# Patient Record
Sex: MALE | Race: WHITE | Employment: OTHER | URBAN - NONMETROPOLITAN AREA
[De-identification: names, ages, dates, MRNs, and addresses within clinical notes are randomized per-mention and may not be internally consistent; named-entity substitution may affect disease eponyms.]

---

## 2020-07-29 ENCOUNTER — OFFICE VISIT (OUTPATIENT)
Dept: PAIN MANAGEMENT | Age: 75
End: 2020-07-29
Payer: MEDICARE

## 2020-07-29 VITALS — DIASTOLIC BLOOD PRESSURE: 65 MMHG | SYSTOLIC BLOOD PRESSURE: 94 MMHG

## 2020-07-29 PROBLEM — S32.020A COMPRESSION FRACTURE OF L2 LUMBAR VERTEBRA (HCC): Status: ACTIVE | Noted: 2020-07-29

## 2020-07-29 PROBLEM — S32.030A COMPRESSION FRACTURE OF L3 LUMBAR VERTEBRA, CLOSED, INITIAL ENCOUNTER (HCC): Status: ACTIVE | Noted: 2020-07-29

## 2020-07-29 PROCEDURE — G8421 BMI NOT CALCULATED: HCPCS | Performed by: NURSE PRACTITIONER

## 2020-07-29 PROCEDURE — 99203 OFFICE O/P NEW LOW 30 MIN: CPT | Performed by: NURSE PRACTITIONER

## 2020-07-29 PROCEDURE — 3017F COLORECTAL CA SCREEN DOC REV: CPT | Performed by: NURSE PRACTITIONER

## 2020-07-29 PROCEDURE — 4040F PNEUMOC VAC/ADMIN/RCVD: CPT | Performed by: NURSE PRACTITIONER

## 2020-07-29 PROCEDURE — 1036F TOBACCO NON-USER: CPT | Performed by: NURSE PRACTITIONER

## 2020-07-29 PROCEDURE — G8427 DOCREV CUR MEDS BY ELIG CLIN: HCPCS | Performed by: NURSE PRACTITIONER

## 2020-07-29 PROCEDURE — 1123F ACP DISCUSS/DSCN MKR DOCD: CPT | Performed by: NURSE PRACTITIONER

## 2020-07-29 RX ORDER — LISINOPRIL 5 MG/1
1 TABLET ORAL DAILY
COMMUNITY
Start: 2019-03-25

## 2020-07-29 RX ORDER — AMLODIPINE BESYLATE 5 MG/1
TABLET ORAL
COMMUNITY
Start: 2020-07-22

## 2020-07-29 RX ORDER — LIDOCAINE 50 MG/G
PATCH TOPICAL
COMMUNITY
Start: 2020-07-19

## 2020-07-29 RX ORDER — HYDROCODONE BITARTRATE AND ACETAMINOPHEN 5; 325 MG/1; MG/1
TABLET ORAL
COMMUNITY
Start: 2020-07-24

## 2020-07-29 RX ORDER — MEMANTINE HYDROCHLORIDE 5 MG/1
TABLET ORAL
COMMUNITY
Start: 2020-07-23

## 2020-07-29 RX ORDER — LORAZEPAM 0.5 MG/1
TABLET ORAL
COMMUNITY
Start: 2020-07-20

## 2020-07-29 RX ORDER — AMOXICILLIN 250 MG
1000 CAPSULE ORAL DAILY
COMMUNITY

## 2020-07-29 ASSESSMENT — ENCOUNTER SYMPTOMS: BACK PAIN: 1

## 2020-07-29 NOTE — PROGRESS NOTES
Subjective:      Patient ID: Hari Blackwood is a 75 y.o. male. Chief Complaint   Patient presents with    New Patient    Lower Back Pain    Other     doing PT at the [facility] Other     doesn't seem quite oriented to time     HPI Initial new patient consult, L2 compression fracture. Pain Assessment  Location of Pain: Back  Severity of Pain: (unable to tell, states that it hurts, went down hard)  Quality of Pain: Aching, Dull  Duration of Pain: Persistent  Frequency of Pain: Intermittent  Aggravating Factors: Bending, Stretching, Straightening, Exercise, Kneeling, Squatting, Standing, Walking  Limiting Behavior: Yes  Result of Injury: Yes(fell at his house prior to coming to the [facility])    Allergies   Allergen Reactions    Penicillins Hives     CHILDHOOD         Outpatient Medications Marked as Taking for the 7/29/20 encounter (Office Visit) with GLEN Shah CNP   Medication Sig Dispense Refill    amLODIPine (NORVASC) 5 MG tablet TAKE 1 TABLET BY MOUTH ONCE DAILY      lidocaine (LIDODERM) 5 % USE 1 PATCH EXTERNALLY (CUT TO SIZE) AND APPLY FOR 12 HOURS. REMOVE AND RE APPLY IN 12 HOURS      lisinopril (PRINIVIL;ZESTRIL) 5 MG tablet Take 1 tablet by mouth daily      memantine (NAMENDA) 5 MG tablet TAKE 1 TABLET BY MOUTH TWICE DAILY FOR 30 DAYS      Cobalamin Combinations (B-12) 100-5000 MCG SUBL Take 1,000 mcg by mouth daily      Multiple Vitamin (MULTI VITAMIN DAILY PO) Take by mouth      Nutritional Supplements (ENSURE ACTIVE PO) Take by mouth      HYDROcodone-acetaminophen (NORCO) 5-325 MG per tablet       LORazepam (ATIVAN) 0.5 MG tablet          History reviewed. No pertinent past medical history. No past surgical history on file. No family history on file.     Social History     Socioeconomic History    Marital status:      Spouse name: None    Number of children: None    Years of education: None    Highest education level: None   Occupational History    None Social Needs    Financial resource strain: None    Food insecurity     Worry: None     Inability: None    Transportation needs     Medical: None     Non-medical: None   Tobacco Use    Smoking status: Never Smoker    Smokeless tobacco: Never Used   Substance and Sexual Activity    Alcohol use: Not Currently    Drug use: Never    Sexual activity: None   Lifestyle    Physical activity     Days per week: None     Minutes per session: None    Stress: None   Relationships    Social connections     Talks on phone: None     Gets together: None     Attends Alevism service: None     Active member of club or organization: None     Attends meetings of clubs or organizations: None     Relationship status: None    Intimate partner violence     Fear of current or ex partner: None     Emotionally abused: None     Physically abused: None     Forced sexual activity: None   Other Topics Concern    None   Social History Narrative    None     Review of Systems   Constitutional: Positive for activity change. Musculoskeletal: Positive for back pain. Neurological:        Hx dementia, CVA   Psychiatric/Behavioral: Positive for confusion. Objective:   Physical Exam  Vitals signs reviewed. Constitutional:       General: He is not in acute distress. Appearance: He is well-developed. Interventions: He is not intubated. HENT:      Head: Normocephalic and atraumatic. Pulmonary:      Effort: Pulmonary effort is normal. No tachypnea, bradypnea, accessory muscle usage, prolonged expiration, respiratory distress or retractions. He is not intubated. Musculoskeletal:      Lumbar back: He exhibits pain. He exhibits no tenderness and no bony tenderness. Skin:     General: Skin is warm and dry. Capillary Refill: Capillary refill takes less than 2 seconds. Nails: There is no clubbing. Neurological:      Mental Status: He is alert. He is disoriented. Cranial Nerves: No cranial nerve deficit. Comments: Very confused, disoriented. Psychiatric:         Speech: Speech normal.         Behavior: Behavior is cooperative. Assessment:       Diagnosis Orders   1. Compression fracture of L2 vertebra, initial encounter ([address]  MRI LUMBAR SPINE WO CONTRAST   2. Compression fracture of L3 lumbar vertebra, closed, initial encounter ([address]  MRI LUMBAR SPINE WO CONTRAST           Plan:      ER notes and CT scan reviewed, acute L2, compression fracture and L3, age undetermined will obtain MRI and likely kyphoplasty L2 as well as L3 based on MRI findings.          Mare Pearl, GLEN - CNP